# Patient Record
Sex: MALE | Race: WHITE | NOT HISPANIC OR LATINO | URBAN - METROPOLITAN AREA
[De-identification: names, ages, dates, MRNs, and addresses within clinical notes are randomized per-mention and may not be internally consistent; named-entity substitution may affect disease eponyms.]

---

## 2018-04-02 ENCOUNTER — RECORDS - HEALTHEAST (OUTPATIENT)
Dept: LAB | Facility: CLINIC | Age: 63
End: 2018-04-02

## 2018-04-03 LAB
ANION GAP SERPL CALCULATED.3IONS-SCNC: 13 MMOL/L (ref 5–18)
BUN SERPL-MCNC: 11 MG/DL (ref 8–22)
CALCIUM SERPL-MCNC: 9.3 MG/DL (ref 8.5–10.5)
CHLORIDE BLD-SCNC: 102 MMOL/L (ref 98–107)
CHOLEST SERPL-MCNC: 267 MG/DL
CO2 SERPL-SCNC: 23 MMOL/L (ref 22–31)
CREAT SERPL-MCNC: 0.84 MG/DL (ref 0.7–1.3)
FASTING STATUS PATIENT QL REPORTED: ABNORMAL
GFR SERPL CREATININE-BSD FRML MDRD: >60 ML/MIN/1.73M2
GLUCOSE BLD-MCNC: 87 MG/DL (ref 70–125)
HDLC SERPL-MCNC: 57 MG/DL
LDLC SERPL CALC-MCNC: 174 MG/DL
POTASSIUM BLD-SCNC: 3.5 MMOL/L (ref 3.5–5)
SODIUM SERPL-SCNC: 138 MMOL/L (ref 136–145)
TRIGL SERPL-MCNC: 181 MG/DL

## 2019-04-18 ENCOUNTER — RECORDS - HEALTHEAST (OUTPATIENT)
Dept: LAB | Facility: CLINIC | Age: 64
End: 2019-04-18

## 2019-04-18 LAB
ANION GAP SERPL CALCULATED.3IONS-SCNC: 12 MMOL/L (ref 5–18)
BUN SERPL-MCNC: 13 MG/DL (ref 8–22)
CALCIUM SERPL-MCNC: 9.8 MG/DL (ref 8.5–10.5)
CHLORIDE BLD-SCNC: 103 MMOL/L (ref 98–107)
CHOLEST SERPL-MCNC: 222 MG/DL
CO2 SERPL-SCNC: 23 MMOL/L (ref 22–31)
CREAT SERPL-MCNC: 0.82 MG/DL (ref 0.7–1.3)
FASTING STATUS PATIENT QL REPORTED: ABNORMAL
GFR SERPL CREATININE-BSD FRML MDRD: >60 ML/MIN/1.73M2
GLUCOSE BLD-MCNC: 81 MG/DL (ref 70–125)
HDLC SERPL-MCNC: 52 MG/DL
LDLC SERPL CALC-MCNC: 136 MG/DL
POTASSIUM BLD-SCNC: 3.6 MMOL/L (ref 3.5–5)
PSA SERPL-MCNC: 1.9 NG/ML (ref 0–4.5)
SODIUM SERPL-SCNC: 138 MMOL/L (ref 136–145)
TRIGL SERPL-MCNC: 170 MG/DL

## 2019-04-19 ENCOUNTER — AMBULATORY - HEALTHEAST (OUTPATIENT)
Dept: SURGERY | Facility: CLINIC | Age: 64
End: 2019-04-19

## 2019-04-19 DIAGNOSIS — K40.90 LEFT INGUINAL HERNIA: ICD-10-CM

## 2019-04-29 ENCOUNTER — OFFICE VISIT - HEALTHEAST (OUTPATIENT)
Dept: SURGERY | Facility: CLINIC | Age: 64
End: 2019-04-29

## 2019-04-29 DIAGNOSIS — K40.90 NON-RECURRENT UNILATERAL INGUINAL HERNIA WITHOUT OBSTRUCTION OR GANGRENE: ICD-10-CM

## 2019-04-29 RX ORDER — TERAZOSIN 5 MG/1
5 CAPSULE ORAL AT BEDTIME
Status: SHIPPED | COMMUNITY
Start: 2019-04-18

## 2019-04-29 RX ORDER — AMLODIPINE BESYLATE 5 MG/1
5 TABLET ORAL DAILY
Status: SHIPPED | COMMUNITY
Start: 2017-08-14

## 2019-04-29 RX ORDER — PRAVASTATIN SODIUM 40 MG
40 TABLET ORAL DAILY
Status: SHIPPED | COMMUNITY
Start: 2018-04-02

## 2019-04-29 RX ORDER — POTASSIUM CHLORIDE 1500 MG/1
20 TABLET, EXTENDED RELEASE ORAL DAILY
Refills: 5 | Status: SHIPPED | COMMUNITY
Start: 2018-05-14

## 2019-04-29 RX ORDER — LOSARTAN POTASSIUM AND HYDROCHLOROTHIAZIDE 25; 100 MG/1; MG/1
TABLET ORAL
Status: SHIPPED | COMMUNITY
Start: 2017-08-14

## 2019-04-29 ASSESSMENT — MIFFLIN-ST. JEOR: SCORE: 1805.61

## 2019-05-03 ENCOUNTER — SURGERY - HEALTHEAST (OUTPATIENT)
Dept: SURGERY | Facility: CLINIC | Age: 64
End: 2019-05-03

## 2019-05-16 ENCOUNTER — RECORDS - HEALTHEAST (OUTPATIENT)
Dept: LAB | Facility: CLINIC | Age: 64
End: 2019-05-16

## 2019-05-17 LAB
ANION GAP SERPL CALCULATED.3IONS-SCNC: 12 MMOL/L (ref 5–18)
BUN SERPL-MCNC: 15 MG/DL (ref 8–22)
CALCIUM SERPL-MCNC: 9.7 MG/DL (ref 8.5–10.5)
CHLORIDE BLD-SCNC: 104 MMOL/L (ref 98–107)
CO2 SERPL-SCNC: 24 MMOL/L (ref 22–31)
CREAT SERPL-MCNC: 0.86 MG/DL (ref 0.7–1.3)
GFR SERPL CREATININE-BSD FRML MDRD: >60 ML/MIN/1.73M2
GLUCOSE BLD-MCNC: 83 MG/DL (ref 70–125)
POTASSIUM BLD-SCNC: 3.5 MMOL/L (ref 3.5–5)
SODIUM SERPL-SCNC: 140 MMOL/L (ref 136–145)

## 2019-05-21 ENCOUNTER — ANESTHESIA - HEALTHEAST (OUTPATIENT)
Dept: SURGERY | Facility: AMBULATORY SURGERY CENTER | Age: 64
End: 2019-05-21

## 2019-05-21 ASSESSMENT — MIFFLIN-ST. JEOR: SCORE: 1805.61

## 2019-05-22 ENCOUNTER — SURGERY - HEALTHEAST (OUTPATIENT)
Dept: SURGERY | Facility: AMBULATORY SURGERY CENTER | Age: 64
End: 2019-05-22

## 2019-05-22 ASSESSMENT — MIFFLIN-ST. JEOR: SCORE: 1805.61

## 2019-10-09 ENCOUNTER — RECORDS - HEALTHEAST (OUTPATIENT)
Dept: ADMINISTRATIVE | Facility: OTHER | Age: 64
End: 2019-10-09

## 2019-10-09 ENCOUNTER — AMBULATORY - HEALTHEAST (OUTPATIENT)
Dept: PHYSICAL MEDICINE AND REHAB | Facility: CLINIC | Age: 64
End: 2019-10-09

## 2019-10-09 DIAGNOSIS — R20.2 TINGLING OF RIGHT UPPER EXTREMITY: ICD-10-CM

## 2019-10-10 ENCOUNTER — RECORDS - HEALTHEAST (OUTPATIENT)
Dept: ADMINISTRATIVE | Facility: OTHER | Age: 64
End: 2019-10-10

## 2019-10-11 ENCOUNTER — HOSPITAL ENCOUNTER (OUTPATIENT)
Dept: PHYSICAL MEDICINE AND REHAB | Facility: CLINIC | Age: 64
Discharge: HOME | End: 2019-10-11
Attending: FAMILY MEDICINE

## 2019-10-11 DIAGNOSIS — R20.2 NUMBNESS AND TINGLING OF RIGHT UPPER EXTREMITY: ICD-10-CM

## 2019-10-11 DIAGNOSIS — M48.02 FORAMINAL STENOSIS OF CERVICAL REGION: ICD-10-CM

## 2019-10-11 DIAGNOSIS — M47.812 ARTHROPATHY OF CERVICAL FACET JOINT: ICD-10-CM

## 2019-10-11 DIAGNOSIS — M54.2 NECK PAIN ON RIGHT SIDE: ICD-10-CM

## 2019-10-11 DIAGNOSIS — M54.59 LUMBAR FACET JOINT PAIN: ICD-10-CM

## 2019-10-11 DIAGNOSIS — R20.0 NUMBNESS AND TINGLING OF RIGHT UPPER EXTREMITY: ICD-10-CM

## 2019-10-21 ENCOUNTER — OFFICE VISIT - HEALTHEAST (OUTPATIENT)
Dept: PHYSICAL THERAPY | Facility: REHABILITATION | Age: 64
End: 2019-10-21

## 2019-10-21 DIAGNOSIS — M54.2 ACUTE NECK PAIN: ICD-10-CM

## 2019-10-21 DIAGNOSIS — R29.3 POOR POSTURE: ICD-10-CM

## 2019-10-21 DIAGNOSIS — M62.81 GENERALIZED MUSCLE WEAKNESS: ICD-10-CM

## 2019-10-25 ENCOUNTER — OFFICE VISIT - HEALTHEAST (OUTPATIENT)
Dept: PHYSICAL THERAPY | Facility: REHABILITATION | Age: 64
End: 2019-10-25

## 2019-10-25 DIAGNOSIS — M62.81 GENERALIZED MUSCLE WEAKNESS: ICD-10-CM

## 2019-10-25 DIAGNOSIS — R29.3 POOR POSTURE: ICD-10-CM

## 2019-10-25 DIAGNOSIS — M54.2 ACUTE NECK PAIN: ICD-10-CM

## 2019-10-31 ENCOUNTER — OFFICE VISIT - HEALTHEAST (OUTPATIENT)
Dept: PHYSICAL THERAPY | Facility: REHABILITATION | Age: 64
End: 2019-10-31

## 2019-10-31 DIAGNOSIS — M62.81 GENERALIZED MUSCLE WEAKNESS: ICD-10-CM

## 2019-10-31 DIAGNOSIS — R29.3 POOR POSTURE: ICD-10-CM

## 2019-10-31 DIAGNOSIS — M54.2 ACUTE NECK PAIN: ICD-10-CM

## 2019-11-14 ENCOUNTER — OFFICE VISIT - HEALTHEAST (OUTPATIENT)
Dept: PHYSICAL THERAPY | Facility: REHABILITATION | Age: 64
End: 2019-11-14

## 2019-11-14 DIAGNOSIS — M54.2 ACUTE NECK PAIN: ICD-10-CM

## 2019-11-14 DIAGNOSIS — R29.3 POOR POSTURE: ICD-10-CM

## 2019-11-14 DIAGNOSIS — M62.81 GENERALIZED MUSCLE WEAKNESS: ICD-10-CM

## 2021-01-12 ENCOUNTER — RECORDS - HEALTHEAST (OUTPATIENT)
Dept: LAB | Facility: CLINIC | Age: 66
End: 2021-01-12

## 2021-01-12 LAB
ANION GAP SERPL CALCULATED.3IONS-SCNC: 12 MMOL/L (ref 5–18)
BUN SERPL-MCNC: 14 MG/DL (ref 8–22)
CALCIUM SERPL-MCNC: 9.5 MG/DL (ref 8.5–10.5)
CHLORIDE BLD-SCNC: 101 MMOL/L (ref 98–107)
CHOLEST SERPL-MCNC: 222 MG/DL
CO2 SERPL-SCNC: 25 MMOL/L (ref 22–31)
CREAT SERPL-MCNC: 0.92 MG/DL (ref 0.7–1.3)
FASTING STATUS PATIENT QL REPORTED: ABNORMAL
GFR SERPL CREATININE-BSD FRML MDRD: >60 ML/MIN/1.73M2
GLUCOSE BLD-MCNC: 132 MG/DL (ref 70–125)
HDLC SERPL-MCNC: 55 MG/DL
LDLC SERPL CALC-MCNC: 130 MG/DL
POTASSIUM BLD-SCNC: 3.1 MMOL/L (ref 3.5–5)
PSA SERPL-MCNC: 2.9 NG/ML (ref 0–4.5)
SODIUM SERPL-SCNC: 138 MMOL/L (ref 136–145)
TRIGL SERPL-MCNC: 184 MG/DL

## 2021-05-28 NOTE — PROGRESS NOTES
HPI:  Steven Avila is a 63 y.o. male who was referred to me by Wilner Tse MD for an inguinal hernia. He  presents today with a newly diagnosed left inguinal hernia.  Not being aware of this until undergoing a physical recently, where he was found to have a small left anal hernia.  He reports occasional mild discomfort in the lower abdominal region, but is not aware of a bulge at baseline, and notes no impediment to his work which is working as a .  Is never had a previous inguinal hernia repair, never had previous umbilical hernia repair.    Allergies:Benazepril    Past Medical History:   Diagnosis Date     Hypertension        Past Surgical History:   Procedure Laterality Date     NO PAST SURGERIES         CURRENT MEDS:  Current Outpatient Medications   Medication Sig Dispense Refill     amLODIPine (NORVASC) 5 MG tablet Take 5 mg by mouth daily.       losartan-hydrochlorothiazide (HYZAAR) 100-25 mg per tablet 1 tab(s)       potassium chloride (K-DUR,KLOR-CON) 20 MEQ tablet Take 20 mEq by mouth daily.  5     pravastatin (PRAVACHOL) 40 MG tablet Take 40 mg by mouth daily.       terazosin (HYTRIN) 5 MG capsule Take 5 mg by mouth at bedtime.       No current facility-administered medications for this visit.        Family History   Problem Relation Age of Onset     Emphysema Mother      Lung cancer Father      Heart disease Father         reports that he has been smoking.  He has been smoking about 0.50 packs per day. He has never used smokeless tobacco. He reports that he drinks alcohol. He reports that he does not use drugs.    Review of Systems -   The 10 point review of systems  is within normal limits except for as mentioned above in the HPI.  General ROS: No complaints or constitutional symptoms  Skin: No complaints or symptoms   Hematologic/Lymphatic: No symptoms or complaints  Psychiatric: No symptoms or complaints  Endocrine: No excessive fatigue, no hypermetabolic symptoms  "reported  Respiratory ROS: no cough, shortness of breath, or wheezing  Cardiovascular ROS: no chest pain or dyspnea on exertion  Gastrointestinal ROS: As per HPI  Musculoskeletal ROS: no recent injuries reported  Neurological ROS: no focal neurologic defects reported.        /70 (Patient Site: Right Arm, Patient Position: Sitting, Cuff Size: Adult Large)   Pulse 68   Ht 5' 10.75\" (1.797 m)   Wt 221 lb (100.2 kg)   SpO2 97%   BMI 31.04 kg/m    Body mass index is 31.04 kg/m .    EXAM:  General : Alert, cooperative, appears stated age   Skin: Skin color, texture, turgor normal, no rashes or lesions   Lymphatic: No obvious adenopathy, no swelling   Eyes: No scleral icterus, pupils equal  HENT: no traumatic injury to the head or face, no gross abnormalities  Lungs: Normal respiratory effort, breath sounds equal bilaterally  Heart: Regular rate and rhythm  Abdomen: Soft, nondistended.  No visual asymmetry in the groin.  No palpable hernia with Valsalva on the right side.  On the left, there is a small subtle inguinal hernia present with Valsalva.  Musculoskeletal: No obvious swelling,  Neurologic: Grossly intact        Assessment/Plan:   1. Non-recurrent unilateral inguinal hernia without obstruction or gangrene        Steven Avila is a 63 y.o. male with an asymptomatic left inguinal hernia.  I have discussed the pathophysiology of inguinal hernias at length as well as the  surgical and non-operative management strategies.      In particular, the risks and benefits of laparoscopic vs. open inguinal hernia surgery were explained in detail which include, but are not limited to, bleeding, infection of the mesh, recurrence of the hernia, chronic pain, poor cosmesis, the need for reoperative intervention, the possibility of conversion from a laparoscopic approach to an open approach, subcutaneous emphysema, injury to vital structures,  blood clots, heart attack, stroke and death.  Additionally, the risks of " observation were also discussed in detail which include, but are not limited to, chronic pain, enlargement of the hernia, incarceration, strangulation and death.      He understands everything that was discussed and given its asymptomatic nature, would like to defer on having repaired at this time.  We did review signs and symptoms of obstruction and strangulation, and is aware that should these develop he should contact us sooner rather than later.  He is aware he can contact us any time should he wish to have this hernia repaired.     Lizandro Magdaleno MD  [phone]  [facility] Department of Surgery

## 2021-05-29 NOTE — ANESTHESIA CARE TRANSFER NOTE
Last vitals:   Vitals:    05/22/19 1232   BP: 142/68   Pulse: 78   Resp: 16   Temp: 36.4  C (97.6  F)   SpO2: 99%     Patient's level of consciousness is drowsy  Spontaneous respirations: yes  Maintains airway independently: yes  Dentition unchanged: yes  Oropharynx: oropharynx clear of all foreign objects    QCDR Measures:  ASA# 20 - Surgical Safety Checklist: WHO surgical safety checklist completed prior to induction    PQRS# 430 - Adult PONV Prevention: 4558F - Pt received => 2 anti-emetic agents (different classes) preop & intraop  ASA# 8 - Peds PONV Prevention: NA - Not pediatric patient, not GA or 2 or more risk factors NOT present  PQRS# 424 - Myrna-op Temp Management: 4559F - At least one body temp DOCUMENTED => 35.5C or 95.9F within required timeframe  PQRS# 426 - PACU Transfer Protocol: - Transfer of care checklist used  ASA# 14 - Acute Post-op Pain: ASA14B - Patient did NOT experience pain >= 7 out of 10

## 2021-05-29 NOTE — ANESTHESIA POSTPROCEDURE EVALUATION
Patient: Steven Avila  REPAIR, HERNIA, INGUINAL, LAPAROSCOPIC  Anesthesia type: general    Patient location: Phase II Recovery  Last vitals:   Vitals Value Taken Time   /72 5/22/2019  1:15 PM   Temp 36.3  C (97.3  F) 5/22/2019  1:15 PM   Pulse 65 5/22/2019  1:24 PM   Resp 16 5/22/2019  1:15 PM   SpO2 93 % 5/22/2019  1:24 PM   Vitals shown include unvalidated device data.  Post vital signs: stable  Level of consciousness: awake and responds to simple questions  Post-anesthesia pain: pain controlled  Post-anesthesia nausea and vomiting: no  Pulmonary: unassisted, return to baseline  Cardiovascular: stable and blood pressure at baseline  Hydration: adequate  Anesthetic events: no    QCDR Measures:  ASA# 11 - Myrna-op Cardiac Arrest: ASA11B - Patient did NOT experience unanticipated cardiac arrest  ASA# 12 - Myrna-op Mortality Rate: ASA12B - Patient did NOT die  ASA# 13 - PACU Re-Intubation Rate: ASA13B - Patient did NOT require a new airway mgmt  ASA# 10 - Composite Anes Safety: ASA10A - No serious adverse event    Additional Notes:

## 2021-06-02 NOTE — PROGRESS NOTES
[facility]   Cervical Thoracic Initial Evaluation    Patient Name: Steven Avila  Date of evaluation: 10/21/2019  Referral Diagnosis: Neck pain on right side  Referring provider: Ofelia Sanders C*  Visit Diagnosis:     ICD-10-CM    1. Acute neck pain M54.2    2. Poor posture R29.3    3. Generalized muscle weakness M62.81         Per insurance verification: approved for  Visits: 18  Start Date: 10/21/2019  End Date: 12/23/2019    Assessment:      Impairments in  pain, posture, ROM, joint mobility, sensory function  The POC is dynamic and will be modified on an ongoing basis.  Barriers to achieving goals as noted in the assessment section may affect outcome.  Prognosis to achieve goals is  good   Pt. is appropriate for skilled PT intervention as outlined in the Plan of Care (POC).  Pt. is a good candidate for skilled PT services to improve pain levels and function.  Plan of care and goals were established in collaboration with patient.     Steven Avila is a 63 y.o. male who presents to therapy today with chief complaints of right sided cervical spine pain and n/t along his R upper trapezius. Onset date of sx was July 2019.  Pt reported h/o repetitive lifting/motions at work. He reports his neck pain started at work but does not remember a specific incident/injury during onset time.  Pain symptoms are getting better as he had this last week off work.  Functional impairments include bed mobility, sleeping on his L side and will have intermittent n/t throughout his day.  Pt demo's signs and sx consistent with acute right sided neck pain likely secondary to facet stenosis with poor seated posture.       Per chart review: Cervical spine MRI [facility] 10/3/2019 does show advanced facet arthropathy in the right at C3-4 with marrow edema consistent with inflammatory arthropathy with severe right foraminal stenosis, moderate to severe left.  Patient also has moderate facet arthropathy in the left at C4-5.  Mild  "right facet at C5-6.       Goals:  Pt. will demonstrate/verbalize independence in self-management of condition in : 4 weeks  Pt. will be independent with home exercise program in : 4 weeks    Pt will: be able to change sleeping positions without symptoms in 8 weeks.   Pt will: work without having symptoms into his right cervical spine/shoulder to return to PLOF in 8 weeks.       Patient's expectations/goals are realistic.    Barriers to Learning or Achieving Goals:  No Barriers.       Plan / Patient Instructions:        Plan of Care:   Authorization / Certification Start Date: 10/21/19  Authorization / Certification End Date: 12/23/19  Communication with: Referral Source  Patient Related Instruction: Nature of Condition;Treatment plan and rationale;Self Care instruction;Body mechanics;Next steps;Expected outcome;Posture;Basis of treatment;Precautions  Times per Week: 1-2  Number of Weeks: 8  Number of Visits: 12  Discharge Planning: when goals are met or pt has reached a plateau in progress  Therapeutic Exercise: ROM;Strengthening;Stretching  Neuromuscular Reeducation: kinesio tape;posture;balance/proprioception;TNE;core  Manual Therapy: soft tissue mobilization;myofascial release;joint mobilization  Equipment: theraband      Plan for next visit: continue with MT as needed, progress cervical/scapular strengthening as able, nerve glides     Subjective:         Social information:   Occupation:   Work Status:Working full time    History of Present Illness:    Steven is a 63 y.o. male who presents to therapy today with complaints of R sided cervical spine pain. Date of onset/duration of symptoms is July 2019. Pt reports it \"goes numb\" on him that happens intermittently throughout his day. It was better this past week but he was on vacation. He does not report any loss of strength. He has also been taking a few ibuprofen for pain relief.     Past medical history significant for hypertension, " hyperlipidemia, advanced adenoma the colon, tobacco use, BPH, inguinal hernia repair    Pain Ratin  Pain rating at best: 0  Pain rating at worst: 7  Pain description: aching, dull and numbness    Functional limitations are described as occurring with:   Change sleeping positions- unable to sleep on L side  Numbness throughout work day             Objective:      Note: Items left blank indicates the item was not performed or not indicated at the time of the evaluation.    Patient Outcome Measures :    Neck Disability Score in %: 10     Scores range from 0-100%, where a score of 0% represents minimal pain and maximal function. The minmal clinically important difference is a score reduction of 10%.    Cervical Thoracic Examination  1. Acute neck pain     2. Poor posture     3. Generalized muscle weakness       Involved side: Right  Posture Observation:      General sitting posture is  fair.    Cervical ROM:    Date: 10/21/2019     *Indicate scale AROM AROM AROM   Cervical Flexion 30 deg     Cervical Extension 18 deg      Right Left Right Left Right Left   Cervical Sidebending 20 deg 20 deg        Cervical Rotation 40 deg 40 deg        Cervical Protraction      Cervical Retraction      Thoracic Flexion      Thoracic Extension      Thoracic Sidebending         Thoracic Rotation           Strength     Date: 10/21/2019     Cervical Myotomes/5 Right Left Right Left Right Left   Cervical Flexion (C1-2)         Cervical Sidebending (C3)         Shoulder Flexion 5 5       Shoulder Abduction (C5) 5 5       Elbow Flexion (C6) 5 5       Elbow Extension (C7) 5 5       Wrist Flexion (C7) 5 5       Wrist Extension (C6) 5 5       Thumb abduction (C8) 5 4       Finger Abduction (T1) 5 4         Sensation    In tact today, pt did not report n/t during testing Reflex Testing  Cervical Dermatomes Right Left UE Reflexes Right Left   Back of the Head (C2)   Biceps (C5-6)     Supraclavicular Fossa (C3) WNL WNL Brachioradialis (C5-6)      AC Joint (C4) WNL WNL Triceps (C7-8)     Lateral Biceps (C5) WNL WNL Taylor s test     Palmar Thumb (C6) WNL WNL LE Reflexes     Palmar 3rd Finger (C7) WNL WNL Patellar (L3-4)     Palmar 5th Finger (C8) WNL WNL Achilles (S1-2)     Ulnar Forearm (T1) WNL WNL Babinski Response         Palpation: mild tenderness along izabella cervical paraspinals, scalenes, and upper trapezius (L>R)    Cervical Special Tests     Cervical Special Tests Right Left UE Nerve Mobility Right Left   Cervical compression - - Median nerve + -   Cervical distraction   Ulnar nerve     Spurling s test - - Radial nerve     Shoulder abduction sign   Thoracic outlet     Deep neck flexor endurance test   Jenna     Upper cervical rotation   Adson s     Sharper-Javy   Cervical rotation lateral flexion     Alar ligament test   Other:     Other:   Other:       UE Screen: Shoulder AROM WNL    Treatment Today     TREATMENT MINUTES COMMENTS   Evaluation 20 -cervical spine   Self-care/ Home management     Manual therapy 10 -supine manual cervical distraction in slight flexion 4 x 30 sec holds  -supine STM to izabella scalenes, cervical paraspinals   Neuromuscular Re-education 5 -PNE education with regards to space, blood flow and movement required for a healthy nervous system  -see nerve glides in flow sheet   Therapeutic Activity     Therapeutic Exercises 15 -see exercise flow sheet   Gait training     Modality__________________                Total 50    Blank areas are intentional and mean the treatment did not include these items.     PT Evaluation Code: (Please list factors)  Patient History/Comorbidities: see above  Examination: cervical spine  Clinical Presentation: stable  Clinical Decision Making: low    Patient History/  Comorbidities Examination  (body structures and functions, activity limitations, and/or participation restrictions) Clinical Presentation Clinical Decision Making (Complexity)   No documented Comorbidities or personal factors 1-2  Elements Stable and/or uncomplicated Low   1-2 documented comorbidities or personal factor 3 Elements Evolving clinical presentation with changing characteristics Moderate   3-4 documented comorbidities or personal factors 4 or more Unstable and unpredictable High                Kaleigh Uriostegui, PT, DPT  10/21/2019  11:34 AM

## 2021-06-02 NOTE — PROGRESS NOTES
" [facility] Daily Progress     Patient Name: Steven Avila  Date: 10/31/2019  Visit #: 3/12 until 19  PTA visit #:  na  Referral Diagnosis: Neck pain on right side  Referring provider:Ofelia Sanders CNP  Visit Diagnosis:     ICD-10-CM    1. Acute neck pain M54.2    2. Poor posture R29.3    3. Generalized muscle weakness M62.81        Past Medical History:   Diagnosis Date     Hypertension          Assessment:     HEP/POC compliance is  good .  Response to Intervention Improvement in pain with modifications to HEP. Demonstrates improvements in cervical AROM since IE.  Patient is benefitting from skilled physical therapy and is making steady progress toward functional goals.  Patient is appropriate to continue with skilled physical therapy intervention, as indicated by initial plan of care.    Goal Status:  Pt. will demonstrate/verbalize independence in self-management of condition in : 4 weeks;Progressing toward  Pt. will be independent with home exercise program in : 4 weeks;Met    Pt will: be able to change sleeping positions without symptoms in 8 weeks.  (progressing towards)  Pt will: work without having symptoms into his right cervical spine/shoulder to return to OF in 8 weeks.       Plan / Patient Education:     Continue with initial plan of care.  Progress with home program as tolerated. cervical joint mobilizations if tolerated    Subjective:     Pain Ratin  Pt states things are better in the morning (only intermittent n/t) and has increased n/t in the evening. Still getting n/t but has improved with modifications to HEP. The exercises do help with his pain- does them as much as he can. Felt sore after the last MT completed- no improvement in pain.      Objective:     Cervical AROM:  Flexion: WNL  Ext: mild  Rotation: R mild - \"tightness\" L mild    Tender with hypertonicity along R UT    Exercises:  Exercise #1: supine chin tuck with retraction on pillow - hold for now as this " positioning caused increase symptoms  Comment #1: reach and roll-modified 5-10 reps, in R S/L  Exercise #2: seated scapular retraction 10x   Comment #2: seated cervical retraction 10x   Exercise #3: median nerve glides 10x on R, decreased height and stopped head turn; 2-3 times a day  Comment #3: UBE 4 min F/ B, WL4.5        Treatment Today     TREATMENT MINUTES COMMENTS   Evaluation     Self-care/ Home management     Manual therapy 10 -supine STM to izabella upper trapezius and scalenes   Neuromuscular Re-education     Therapeutic Activity     Therapeutic Exercises 15 -see exercise flow sheet for date completed   Gait training     Modality__________________                Total 25    Blank areas are intentional and mean the treatment did not include these items.       Kaleigh Uriostegui, PT, DPT  10/31/2019

## 2021-06-02 NOTE — PATIENT INSTRUCTIONS - HE
Discussed the importance of core strengthening, ROM, stretching exercises with the patient and how each of these entities is important in decreasing pain.  Explained to the patient that the purpose of physical therapy is to teach the patient a home exercise program.  These exercises need to be performed every day in order to decrease pain and prevent future occurrences of pain.        ~Please call Nurse Navigation line [phone] with any questions or concerns about your treatment plan, if symptoms worsen and you would like to be seen urgently, or if you have problems controlling bladder and bowel function.      [facility] locations:    [place] - [phone]  [address] [place] [address]      [facility] [phone]  [address]  [facility] [place] [place] [address]        [facility] [phone]  [address] [place] [address]      [facility] - [phone]  [address] [place] [address]      [place] [phone]   [address] [place] [address]

## 2021-06-02 NOTE — PROGRESS NOTES
ASSESSMENT: Steven Avila is a 63 y.o. male presents for consultation at the request of PCP Wilner Tse MD, with past medical history significant for hypertension, hyperlipidemia, advanced adenoma the colon, tobacco use, BPH, inguinal hernia repair, who presents today for new patient evaluation of :     -Right upper cervical spine pain that radiates to the right trapezius with paresthesias.  Pain most consistent with cervical facet mediated pain and he does have severe facet arthropathy at C3-4.  Some increased pain with Spurling maneuver as well indicating radiculitis with paresthesias, he also has significant right foraminal stenosis at this level.    Patient is neurologically intact on exam. No myelopathic or red flag symptoms.      NDI Score: 14    WHO 5: 25       Diagnoses and all orders for this visit:    Neck pain on right side  -     Ambulatory referral to PT/OT    Numbness and tingling of right upper extremity  -     Ambulatory referral to PT/OT    Arthropathy of cervical facet joint  -     Ambulatory referral to PT/OT    Lumbar facet joint pain  -     Ambulatory referral to PT/OT    Foraminal stenosis of cervical region  -     Ambulatory referral to PT/OT       PLAN:  Reviewed spine anatomy and disease process. Discussed diagnosis and treatment options with the patient today. A shared decision making model was used. The patient's values and choices were respected. The following represents what was discussed and decided upon by the provider and the patient.     -DIAGNOSTIC TESTS:  Images were personally reviewed and interpreted and explained to patient today using spine model.   --Cervical spine MRI [facility] 10/3/2019 does show advanced facet arthropathy in the right at C3-4 with marrow edema consistent with inflammatory arthropathy with severe right foraminal stenosis, moderate to severe left.  Patient also has moderate facet arthropathy in the left at C4-5.  Mild right facet at C5-6.    -PHYSICAL  THERAPY: [facility] to establish home exercise program.  Discussed the importance of core strengthening, ROM, stretching exercises with the patient and how each of these entities is important in decreasing pain.  Explained to the patient that the purpose of physical therapy is to teach the patient a home exercise program.  These exercises need to be performed every day in order to decrease pain and prevent future occurrences of pain.  Likened it to brushing one's teeth.      -MEDICATIONS: No change in medications at this time as patient prefers to continue with ibuprofen only as needed which is helpful.  He prefers not to take medications on a daily basis as the pain is tolerable.  Discussed multiple medication options today with patient. Discussed risks, side effects, and proper use of medications. Patient verbalized understanding.    -INTERVENTIONS: Discussed with patient that if symptoms do not improve with physical therapy or aggravated with physical therapy I would recommend trialing a right C3-4 facet joint steroid injection as he does have advanced facet arthropathy with marrow edema at this level.  If he did not get relief with the facet injection we could consider a right with C3-4 TFESI as he also has severe right foraminal stenosis at this level and some radicular symptoms..  Discussed risks and benefits of injections with patient today.    -PATIENT EDUCATION: 45 minutes of total visit time was spent face to face with the patient today, greater than 50% of total time spent with patient was spent on counseling, education, and coordinating care.   -10 minutes spent outside of visit time, non-face-to-face time, reviewing chart.    -FOLLOW-UP:   Follow-up as needed if symptoms are not improving with PT or worsening/change at any time.    Advised patient to call the [facility] if symptoms worsen or you have problems controlling bladder and bowel function.    ______________________________________________________________________    SUBJECTIVE:   Steven Avila  is a 63 y.o. male who presents today for new patient evaluation of neck pain right upper cervical spine that radiates to the right trapezius  region with numbness and tingling, that is been ongoing since approximately July 1, patient is unsure of specific date.  Does report that he was at work when the pain started and he does have a significant amount of repetitive lifting that he has to do but does not recall a specific injury at work precipitating the pain.  Patient currently reports his right-sided neck pain that is a 2/10, a 5 at its worst most bothersome with turning his head to the right.    Patient denies any further arm pain, denies upper extremity weakness, denies recent trips or falls or balance changes, denies bowel or bladder loss control.  No prior history of similar neck pain in the past.    -Treatment to Date: No prior spinal surgery or spinal injection.  No prior physical therapy for neck pain.    -Medications:  Tramadol 50 mg prescribed 5/22/2019, 8 tablets not currently taking  Ibuprofen takes on occasions for pain.  Medrol Dosepak 9/20/2019 with some relief.    Current Outpatient Medications on File Prior to Encounter   Medication Sig Dispense Refill     amLODIPine (NORVASC) 5 MG tablet Take 5 mg by mouth daily.       losartan-hydrochlorothiazide (HYZAAR) 100-25 mg per tablet 1 tab(s)       potassium chloride (K-DUR,KLOR-CON) 20 MEQ tablet Take 20 mEq by mouth daily.  5     pravastatin (PRAVACHOL) 40 MG tablet Take 40 mg by mouth daily.       terazosin (HYTRIN) 5 MG capsule Take 5 mg by mouth at bedtime.       [DISCONTINUED] traMADol (ULTRAM) 50 mg tablet Take 1 tablet (50 mg total) by mouth every 6 (six) hours as needed for pain. 8 tablet 0     No current facility-administered medications on file prior to encounter.        Allergies   Allergen Reactions     Benazepril      Other reaction(s):  cough       Past Medical History:   Diagnosis Date     Hypertension         Patient Active Problem List   Diagnosis     Inguinal hernia       Past Surgical History:   Procedure Laterality Date     COLONOSCOPY       LAPAROSCOPIC INGUINAL HERNIA REPAIR Left 5/22/2019    Procedure: REPAIR, HERNIA, INGUINAL, LAPAROSCOPIC;  Surgeon: Lizandro Magdaleno MD;  Location: [facility];  Service: General     NO PAST SURGERIES         Family History   Problem Relation Age of Onset     Emphysema Mother      Lung cancer Father      Heart disease Father        Reviewed past medical, surgical, and family history with patient found on new patient intake packet located in EMR Media tab.     SOCIAL HX: Patient is currently working as a  and still working without difficulties.  Patient is .  Patient does smoke on a daily basis as well as drink alcohol but denies being history of a heavy drinker, denies occasional drug use.    ROS: Positive for ringing in ears, cough.  Specifically negative for bowel/bladder dysfunction, balance changes, headache, dizziness, foot drop, fevers, chills, appetite changes, nausea/vomiting, unexplained weight loss. Otherwise 13 systems reviewed are negative. Please see the patient's intake questionnaire from today for details.    OBJECTIVE:  /77 (Patient Site: Right Arm, Patient Position: Sitting)   Pulse 77   Wt 204 lb (92.5 kg)   SpO2 97%   BMI 28.65 kg/m      PHYSICAL EXAMINATION:  --CONSTITUTIONAL: Vital signs as above. No acute distress. The patient is well nourished and well groomed.  --PSYCHIATRIC: The patient is awake, alert, oriented to person, place, time and answering questions appropriately with clear speech. Appropriate mood and affect   --HEENT: Sclera are non-injected. Extraocular muscles are intact. Thyroid moves easily upon swallowing.  Moist oral mucosa.  --SKIN: Skin over the face, bilateral upper extremities, and posterior torso is clean, dry, intact  without rashes.  --RESPIRATORY: Normal rhythm and effort. No abnormal accessory muscle breathing patterns noted.   --GROSS MOTOR: Easily arises from a seated position.   --CERVICAL SPINE: Inspection reveals no evidence of deformity. Range of motion is not limited in cervical flexion, extension, increased pain with lateral rotation on the right only.  Mild tenderness to palpation cervical spine right C3-4 region.  Spurling maneuver negative bilaterally on the left and mildly positive on the right with right neck and scapular paresthesias.  --SHOULDERS: Full range of motion bilaterally. Negative empty can.  --UPPER EXTREMITY MOTOR TESTING:  Wrist flexion left 5/5, right 5/5  Wrist extension left 5/5, right 5/5  Pronators left 5/5, right 5/5  Biceps left 5/5, right 5/5   Triceps left 5/5, right 5/5   Shoulder abduction left 5/5, right 5/5   left 5/5, right 5/5  --NEUROLOGIC: CN III-XII are grossly intact. 2/4 symmetric biceps, brachioradialis, triceps reflexes bilaterally. Sensation to upper extremities is no.  Negative Rider's bilaterally.    --VASCULAR: 2/4 radial pulses bilaterally. Warm upper limbs bilaterally. Capillary refill in the upper extremities is less than 1 second.    RESULTS: Prior medical records from [facility] 4/29/2019 to [facility],  and [facility] everywhere were reviewed today.     Imaging:  Cervical spine Imaging was personally reviewed and interpreted today. The images were shown to the patient and the findings were explained using a spine model.      MR CERVICAL SPINE WITHOUT CONTRAST 1.5T  [facility]- 10/3/2019  CLINICAL INFORMATION: Right neck, trapezius and shoulder pain with tingling and numbness in the right arm.  TECHNICAL INFORMATION: T1, T2 GRE, T2 FSE and STIR sagittal thin sections with T2 GRE and FSE axial sections at selected levels.  COMPARISON IMAGES: No comparisons.  INTERPRETATION: Normal signal intensity within the cervical and upper thoracic cord. No Chiari malformation  or syrinx, no intrinsic cord lesion and no intradural mass. Normal vertebral artery flow voids.  Diffuse spondylosis with lordotic alignment of the cervical vertebrae. No posterior ligamentous injury and no spinous process avulsion.  T2-3, T1-2 and C7-T1: Dorsal disc margins and facet joints normal. No stenosis or impingement.  C6-7: Mild dorsal bulging. Mild right and moderate left foraminal stenosis with mild facet arthropathy on the left.  C5-6: Normal dorsal disc margins with mild right facet arthropathy and no stenosis or impingement.  C4-5: 2-3 mm broad-based left paracentral disc protrusion with mild left ventral cord flattening. Moderate foraminal stenosis with moderate facet arthropathy on the left.  C3-4: Normal dorsal disc margins. Advanced inflammatory facet arthropathy on the right with facet subchondral marrow edema, reactive or inflammatory changes within periarticular soft tissues and marked narrowing of the right neural foramen. Moderately severe foraminal stenosis and moderate facet arthropathy on the left.  C2-3: Normal dorsal disc margins with facet hypertrophy, facet autofusion and moderate foraminal stenosis on the left.  Mild degenerative changes are seen anteriorly at C1-2. No degenerative or erosive changes within the lateral atlantoaxial nor within the cervico-occipital joints.  Marrow signal is otherwise unremarkable. No fracture or avulsion. No destructive bone lesion and no paraspinous mass.  CONCLUSION:  1. Advanced inflammatory facet arthropathy on the right at C3-4 with marked narrowing of the neural foramen, facet subchondral marrow edema and reactive or inflammatory changes within periarticular soft tissues.  2. 2-3 mm left paracentral disc protrusion at C4-5 with mild left ventral cord flattening.  3. Facet arthropathy on the left at multiple levels with facet autofusion at C2-3.  4. Foraminal stenosis on the left, moderately severe at C3-4, and moderate at C6-7, C4-5 and C2-3.

## 2021-06-02 NOTE — PROGRESS NOTES
[facility] Daily Progress     Patient Name: Steven Avila  Date: 10/25/2019  Visit #: 2/12 until 12/23/19  PTA visit #:  na  Referral Diagnosis: Neck pain on right side  Referring provider:Ofelia Sanders CNP  Visit Diagnosis:     ICD-10-CM    1. Acute neck pain M54.2    2. Poor posture R29.3    3. Generalized muscle weakness M62.81        Past Medical History:   Diagnosis Date     Hypertension          Assessment:     HEP/POC compliance is  good .  Response to Intervention Modifications to HEP as it was increasing n/t into his R shoulder.   Patient is appropriate to continue with skilled physical therapy intervention, as indicated by initial plan of care.    Goal Status:  Pt. will demonstrate/verbalize independence in self-management of condition in : 4 weeks  Pt. will be independent with home exercise program in : 4 weeks    Pt will: be able to change sleeping positions without symptoms in 8 weeks.   Pt will: work without having symptoms into his right cervical spine/shoulder to return to PLOF in 8 weeks.       Plan / Patient Education:     Continue with initial plan of care.  Progress with home program as tolerated.    Subjective:     Pain Rating: 3  Pt reports increased pain/numbness on Tuesday. Has been trying the exercises but gets numbness/tingling with turning to the right every time.       Objective:     Cervical AROM:  Flexion: mild  Ext: mod    Exercises:  Exercise #1: supine chin tuck with retraction on pillow 10x 5 sec hold  Comment #1: reach and roll 5-10 reps, in R S/L  Exercise #2: seated scapular retraction 10x   Comment #2: seated cervical retraction 10x   Exercise #3: median nerve glides 10x on R  Comment #3: UBE 4 min F/ B, WL4.0        Treatment Today     TREATMENT MINUTES COMMENTS   Evaluation     Self-care/ Home management     Manual therapy 8 -supine manual cervical distraction in slight flexion 8 x 30 sec holds   Neuromuscular Re-education     Therapeutic Activity     Therapeutic  Exercises 17 -see exercise flow sheet for date completed   Gait training     Modality__________________                Total 25    Blank areas are intentional and mean the treatment did not include these items.       Kaleigh Uriostegui, PT, DPT  10/25/2019

## 2021-06-03 VITALS — WEIGHT: 221 LBS | BODY MASS INDEX: 30.94 KG/M2 | HEIGHT: 71 IN

## 2021-06-03 VITALS — WEIGHT: 204 LBS | BODY MASS INDEX: 28.65 KG/M2

## 2021-06-03 VITALS — BODY MASS INDEX: 30.94 KG/M2 | HEIGHT: 71 IN | WEIGHT: 221 LBS

## 2021-06-03 NOTE — PROGRESS NOTES
[facility] Daily Progress / Discharge Summary    Patient Name: Steven Avila  Date: 2019  Visit #: 4  PTA visit #:  na  Referral Diagnosis: Neck pain on right side  Referring provider: Ofelia Sanders CNP  Visit Diagnosis:     ICD-10-CM    1. Acute neck pain M54.2    2. Poor posture R29.3    3. Generalized muscle weakness M62.81          Assessment:   HEP/POC compliance is  good .  Response to Intervention Pt demonstrates improvements in his cervical AROM, strength and function. He is compliant with his HEP and would like to continue with it independently at this time. Was instructed to follow up with PT by 19 if new concerns/pain arises.    Goal Status:  Pt. will demonstrate/verbalize independence in self-management of condition in : 4 weeks;Met  Pt. will be independent with home exercise program in : 4 weeks;Met    Pt will: be able to change sleeping positions without symptoms in 8 weeks.  (MET)  Pt will: work without having symptoms into his right cervical spine/shoulder to return to OF in 8 weeks.  (n/a, pt has been on vacation, not sure if he will return to work)      Plan / Patient Education:     Initial plan of care has been completed. Patient has responded appropriately to skilled PT intervention.  The patient met goals and has demonstrated understanding of/independence in the home program for self-care and progression to next steps.  The patient will initiate contact if questions or concerns arise.    Subjective:     Pain Ratin  Pt reports things are better. He does not really have any pain just intermittent tingling now. Overall, the tingling is less intense and some days is less often in frequency than others. Exercises are going well. He was able to sleep on his L side now- intermittently having tingling but not consistent.     Patient Outcome Measures  Neck Disability Score in %: 10     Scores range from 0-100%, where a score of 0% represents minimal pain and  maximal function. The minmal clinically important difference is a score reduction of 10%.      Objective:     ervical ROM:    Date: 10/21/2019 11/14/19    *Indicate scale AROM AROM AROM   Cervical Flexion 30 deg 40 deg    Cervical Extension 18 deg 30 deg     Right Left Right Left Right Left   Cervical Sidebending 20 deg 20 deg  20 deg 20 deg     Cervical Rotation 40 deg 40 deg  60 deg 65 deg     Cervical Protraction      Cervical Retraction      Thoracic Flexion      Thoracic Extension      Thoracic Sidebending         Thoracic Rotation           Strength     Date: 10/21/2019 11/14/19    Cervical Myotomes/5 Right Left Right Left Right Left   Cervical Flexion (C1-2)         Cervical Sidebending (C3)         Shoulder Flexion 5 5 5 5     Shoulder Abduction (C5) 5 5 5 5     Elbow Flexion (C6) 5 5 5 5     Elbow Extension (C7) 5 5 5 5     Wrist Flexion (C7) 5 5 5 5     Wrist Extension (C6) 5 5 5 5     Thumb abduction (C8) 5 4 5 4+     Finger Abduction (T1) 5 4 5 4+         Treatment Today  TREATMENT MINUTES COMMENTS   Evaluation     Self-care/ Home management     Manual therapy     Neuromuscular Re-education     Therapeutic Activity     Therapeutic Exercises 25 -see exercise flow sheet for date completed  -educated on discharge- continue with HEP (nerve glides daily, strengthening 3-4x/week, stretches daily) can slowly decrease nerve glides once n/t is gone   Gait training     Modality__________________                Total 25    Blank areas are intentional and mean the treatment did not include these items.     Kaleigh Uriostegui, PT, DPT  11/14/2019

## 2021-06-16 PROBLEM — K40.90 INGUINAL HERNIA: Status: ACTIVE | Noted: 2019-05-07

## 2021-06-17 NOTE — PATIENT INSTRUCTIONS - HE
Patient Instructions by Kaleigh Uriostegui PT at 10/31/2019  9:00 AM     Author: Kaleigh Uriostegui PT Service: -- Author Type: Physical Therapist    Filed: 10/31/2019  9:24 AM Encounter Date: 10/31/2019 Status: Signed    : Kaleigh Uriostegui PT (Physical Therapist)        CERVICAL SIDE BEND    Tilt your head towards the side, then return back to looking straight ahead.  (Be sure to keep you eyes and nose pointed straight ahead the entire time)    Hold 5-10 sec, 3-5 times,

## 2021-06-17 NOTE — PATIENT INSTRUCTIONS - HE
Patient Instructions by Kaleigh Uriostegui PT at 10/25/2019  7:30 AM     Author: Kaleigh Uriostegui PT Service: -- Author Type: Physical Therapist    Filed: 10/25/2019  7:54 AM Encounter Date: 10/25/2019 Status: Signed    : Kaleigh Uriostegui PT (Physical Therapist)        SIDELYING TRUNK ROTATION - MODIFIED    While lying on your side with your knees bent,  slowly twist your upper body to the side and rotated your spine.    LAY ON RIGHT SIDE ONLY    Hold 3-5 sec, 5-10 reps, 1-3 times a day       Look at your hand   Extend your arm out to the side, slightly backward and down   Follow your hand with your eyes   Get a nice, easy stretch (may get a few tingles...which is OK)   Return the hand to the front of the face   5-10 reps, 1-3 times a day      STOP the exercise lying on your back tucking chin down

## 2021-06-17 NOTE — PATIENT INSTRUCTIONS - HE
Patient Instructions by Kaleigh Uriostegui PT at 10/21/2019 11:30 AM     Author: Kaleigh Uriostegui PT Service: -- Author Type: Physical Therapist    Filed: 10/21/2019 12:34 PM Encounter Date: 10/21/2019 Status: Signed    : Kaleigh Uriostegui PT (Physical Therapist)        CERVICAL CHIN TUCK  AND RETRACTION - SUPINE WITH TOWEL    While lying on your back with a small folded up towel under your head, tuck your chin towards your chest. Also, focus on putting pressure on the towel with the back of your head.     Maintain contact of head with the towel the entire time.    Hold 5-10 sec, 10 reps      SIDELYING TRUNK ROTATION    While lying on your side with your arms out-stretched in front of your body, slowly twist your upper body to the side and rotated your spine. Your arms and head should also be rotating along with the spine as shown. Follow your hand with your eyes.    Hold 5-10 sec, 5-10 reps          RETRACTION / CHIN TUCK    Slowly draw your head back so that your ears line up with your shoulders.    Hold 2-3 sec, 5-10 reps, throughout day        SCAPULAR RETRACTIONS    Draw your shoulder blades back and down.    Hold 2-3 sec, 10 reps, throughout day         Look at your hand   Extend your arm out to the side, slightly backward and down   Follow your hand with your eyes   Get a nice, easy stretch (may get a few tingles...which is OK)   Return the hand to the front of the face   5-10 reps, 2-3 times per day, right arm

## 2021-06-17 NOTE — PATIENT INSTRUCTIONS - HE
Patient Instructions by Kaleigh Uriostegui PT at 11/14/2019  1:30 PM     Author: Kaleigh Uriostegui PT Service: -- Author Type: Physical Therapist    Filed: 11/14/2019  1:54 PM Encounter Date: 11/14/2019 Status: Addendum    : Kaleigh Uriostegui PT (Physical Therapist)    Related Notes: Original Note by Kaleigh Uriostegui PT (Physical Therapist) filed at 11/14/2019  1:50 PM        ELASTIC BAND ROWS     Holding elastic band with both hands, draw back the band as you bend your elbows. Keep your elbows near the side of your body.          CHIN TUCK    In hands and knees position, tuck chin in and hold head up. Keep head parallel with the ground, you could also do this lying down on stomach.    Hold 2-3 sec, work up to 10 sec hold, 10 reps     Do these 2 exercise about 3-4 times per week

## 2021-07-03 NOTE — ANESTHESIA PREPROCEDURE EVALUATION
Anesthesia Preprocedure Evaluation by Zacarias Vick MD at 5/22/2019 10:50 AM     Author: Zacarias Vick MD Service: -- Author Type: Physician    Filed: 5/22/2019 10:51 AM Date of Service: 5/22/2019 10:50 AM Status: Signed    : Zacarias Vick MD (Physician)       Anesthesia Evaluation      Patient summary reviewed   No history of anesthetic complications     Airway   Mallampati: II  Neck ROM: full   Pulmonary - normal exam   (+) a smoker                         Cardiovascular - normal exam  Exercise tolerance: > or = 4 METS  (+) hypertension well controlled, , hypercholesterolemia,      Neuro/Psych - negative ROS     Endo/Other    (+) obesity (bmi 31),      GI/Hepatic/Renal - negative ROS           Dental    (+) chipped                           Anesthesia Plan  Planned anesthetic: general endotracheal  Versed/fent/prop/kerline  Decadron/zofran  ASA 2   Induction: intravenous   Anesthetic plan and risks discussed with: patient and spouse  Anesthesia plan special considerations: antiemetics,   Post-op plan: routine recovery        Chemistry        Component Value Date/Time     05/16/2019 1658    K 3.5 05/16/2019 1658     05/16/2019 1658    CO2 24 05/16/2019 1658    BUN 15 05/16/2019 1658    CREATININE 0.86 05/16/2019 1658    GLU 83 05/16/2019 1658        Component Value Date/Time    CALCIUM 9.7 05/16/2019 1658

## 2021-10-18 ENCOUNTER — LAB REQUISITION (OUTPATIENT)
Dept: LAB | Facility: CLINIC | Age: 66
End: 2021-10-18

## 2021-10-18 DIAGNOSIS — I10 ESSENTIAL (PRIMARY) HYPERTENSION: ICD-10-CM

## 2021-10-18 DIAGNOSIS — E78.5 HYPERLIPIDEMIA, UNSPECIFIED: ICD-10-CM

## 2021-10-18 DIAGNOSIS — R42 DIZZINESS AND GIDDINESS: ICD-10-CM

## 2021-10-18 LAB
ALBUMIN SERPL-MCNC: 4.4 G/DL (ref 3.5–5)
ALP SERPL-CCNC: 93 U/L (ref 45–120)
ALT SERPL W P-5'-P-CCNC: 30 U/L (ref 0–45)
ANION GAP SERPL CALCULATED.3IONS-SCNC: 10 MMOL/L (ref 5–18)
AST SERPL W P-5'-P-CCNC: 19 U/L (ref 0–40)
BILIRUB SERPL-MCNC: 0.5 MG/DL (ref 0–1)
BUN SERPL-MCNC: 13 MG/DL (ref 8–22)
CALCIUM SERPL-MCNC: 10 MG/DL (ref 8.5–10.5)
CHLORIDE BLD-SCNC: 104 MMOL/L (ref 98–107)
CHOLEST SERPL-MCNC: 223 MG/DL
CO2 SERPL-SCNC: 26 MMOL/L (ref 22–31)
CREAT SERPL-MCNC: 0.85 MG/DL (ref 0.7–1.3)
GFR SERPL CREATININE-BSD FRML MDRD: >90 ML/MIN/1.73M2
GLUCOSE BLD-MCNC: 104 MG/DL (ref 70–125)
HDLC SERPL-MCNC: 63 MG/DL
LDLC SERPL CALC-MCNC: 141 MG/DL
POTASSIUM BLD-SCNC: 3.9 MMOL/L (ref 3.5–5)
PROT SERPL-MCNC: 7.3 G/DL (ref 6–8)
SODIUM SERPL-SCNC: 140 MMOL/L (ref 136–145)
TRIGL SERPL-MCNC: 94 MG/DL

## 2021-10-18 PROCEDURE — 80061 LIPID PANEL: CPT | Performed by: FAMILY MEDICINE

## 2021-10-18 PROCEDURE — 84075 ASSAY ALKALINE PHOSPHATASE: CPT | Performed by: FAMILY MEDICINE

## 2022-02-08 ENCOUNTER — LAB REQUISITION (OUTPATIENT)
Dept: LAB | Facility: CLINIC | Age: 67
End: 2022-02-08
Payer: COMMERCIAL

## 2022-02-08 DIAGNOSIS — Z12.5 ENCOUNTER FOR SCREENING FOR MALIGNANT NEOPLASM OF PROSTATE: ICD-10-CM

## 2022-02-08 DIAGNOSIS — I10 ESSENTIAL (PRIMARY) HYPERTENSION: ICD-10-CM

## 2022-02-08 LAB
ANION GAP SERPL CALCULATED.3IONS-SCNC: 11 MMOL/L (ref 5–18)
BUN SERPL-MCNC: 14 MG/DL (ref 8–22)
CALCIUM SERPL-MCNC: 9.5 MG/DL (ref 8.5–10.5)
CHLORIDE BLD-SCNC: 104 MMOL/L (ref 98–107)
CO2 SERPL-SCNC: 23 MMOL/L (ref 22–31)
CREAT SERPL-MCNC: 0.82 MG/DL (ref 0.7–1.3)
GFR SERPL CREATININE-BSD FRML MDRD: >90 ML/MIN/1.73M2
GLUCOSE BLD-MCNC: 109 MG/DL (ref 70–125)
POTASSIUM BLD-SCNC: 3.9 MMOL/L (ref 3.5–5)
PSA SERPL-MCNC: 2.93 UG/L (ref 0–4.5)
SODIUM SERPL-SCNC: 138 MMOL/L (ref 136–145)

## 2022-02-08 PROCEDURE — G0103 PSA SCREENING: HCPCS | Mod: ORL | Performed by: FAMILY MEDICINE

## 2022-02-08 PROCEDURE — 82310 ASSAY OF CALCIUM: CPT | Performed by: FAMILY MEDICINE

## 2022-08-23 ENCOUNTER — LAB REQUISITION (OUTPATIENT)
Dept: LAB | Facility: CLINIC | Age: 67
End: 2022-08-23
Payer: COMMERCIAL

## 2022-08-23 DIAGNOSIS — E78.5 HYPERLIPIDEMIA, UNSPECIFIED: ICD-10-CM

## 2022-08-23 DIAGNOSIS — I10 ESSENTIAL (PRIMARY) HYPERTENSION: ICD-10-CM

## 2022-08-23 LAB
ANION GAP SERPL CALCULATED.3IONS-SCNC: 9 MMOL/L (ref 7–15)
BUN SERPL-MCNC: 9.7 MG/DL (ref 8–23)
CALCIUM SERPL-MCNC: 9.3 MG/DL (ref 8.8–10.2)
CHLORIDE SERPL-SCNC: 103 MMOL/L (ref 98–107)
CHOLEST SERPL-MCNC: 236 MG/DL
CREAT SERPL-MCNC: 0.91 MG/DL (ref 0.67–1.17)
DEPRECATED HCO3 PLAS-SCNC: 28 MMOL/L (ref 22–29)
GFR SERPL CREATININE-BSD FRML MDRD: >90 ML/MIN/1.73M2
GLUCOSE SERPL-MCNC: 109 MG/DL (ref 70–99)
HDLC SERPL-MCNC: 53 MG/DL
LDLC SERPL CALC-MCNC: 158 MG/DL
NONHDLC SERPL-MCNC: 183 MG/DL
POTASSIUM SERPL-SCNC: 4.1 MMOL/L (ref 3.4–5.3)
SODIUM SERPL-SCNC: 140 MMOL/L (ref 136–145)
TRIGL SERPL-MCNC: 126 MG/DL

## 2022-08-23 PROCEDURE — 80061 LIPID PANEL: CPT | Mod: ORL | Performed by: FAMILY MEDICINE

## 2022-08-23 PROCEDURE — 80048 BASIC METABOLIC PNL TOTAL CA: CPT | Mod: ORL | Performed by: FAMILY MEDICINE

## 2023-05-22 ENCOUNTER — LAB REQUISITION (OUTPATIENT)
Dept: LAB | Facility: CLINIC | Age: 68
End: 2023-05-22
Payer: COMMERCIAL

## 2023-05-22 DIAGNOSIS — N40.1 BENIGN PROSTATIC HYPERPLASIA WITH LOWER URINARY TRACT SYMPTOMS: ICD-10-CM

## 2023-05-22 DIAGNOSIS — I10 ESSENTIAL (PRIMARY) HYPERTENSION: ICD-10-CM

## 2023-05-22 PROCEDURE — G0103 PSA SCREENING: HCPCS | Mod: ORL | Performed by: FAMILY MEDICINE

## 2023-05-22 PROCEDURE — 80048 BASIC METABOLIC PNL TOTAL CA: CPT | Mod: ORL | Performed by: FAMILY MEDICINE

## 2023-05-22 PROCEDURE — 84153 ASSAY OF PSA TOTAL: CPT | Mod: ORL | Performed by: FAMILY MEDICINE

## 2023-05-23 LAB
ANION GAP SERPL CALCULATED.3IONS-SCNC: 15 MMOL/L (ref 7–15)
BUN SERPL-MCNC: 11.1 MG/DL (ref 8–23)
CALCIUM SERPL-MCNC: 9.3 MG/DL (ref 8.8–10.2)
CHLORIDE SERPL-SCNC: 100 MMOL/L (ref 98–107)
CREAT SERPL-MCNC: 0.94 MG/DL (ref 0.67–1.17)
DEPRECATED HCO3 PLAS-SCNC: 25 MMOL/L (ref 22–29)
GFR SERPL CREATININE-BSD FRML MDRD: 89 ML/MIN/1.73M2
GLUCOSE SERPL-MCNC: 84 MG/DL (ref 70–99)
POTASSIUM SERPL-SCNC: 3.8 MMOL/L (ref 3.4–5.3)
PSA SERPL DL<=0.01 NG/ML-MCNC: 10.3 NG/ML (ref 0–4.5)
SODIUM SERPL-SCNC: 140 MMOL/L (ref 136–145)

## 2024-10-04 ENCOUNTER — LAB REQUISITION (OUTPATIENT)
Dept: LAB | Facility: CLINIC | Age: 69
End: 2024-10-04
Payer: COMMERCIAL

## 2024-10-04 DIAGNOSIS — I10 ESSENTIAL (PRIMARY) HYPERTENSION: ICD-10-CM

## 2024-10-04 DIAGNOSIS — E78.5 HYPERLIPIDEMIA, UNSPECIFIED: ICD-10-CM

## 2024-10-04 PROCEDURE — 80048 BASIC METABOLIC PNL TOTAL CA: CPT | Mod: ORL | Performed by: FAMILY MEDICINE

## 2024-10-04 PROCEDURE — 80061 LIPID PANEL: CPT | Mod: ORL | Performed by: FAMILY MEDICINE

## 2024-10-05 LAB
ANION GAP SERPL CALCULATED.3IONS-SCNC: 14 MMOL/L (ref 7–15)
BUN SERPL-MCNC: 16.1 MG/DL (ref 8–23)
CALCIUM SERPL-MCNC: 9.4 MG/DL (ref 8.8–10.4)
CHLORIDE SERPL-SCNC: 102 MMOL/L (ref 98–107)
CHOLEST SERPL-MCNC: 243 MG/DL
CREAT SERPL-MCNC: 0.85 MG/DL (ref 0.67–1.17)
EGFRCR SERPLBLD CKD-EPI 2021: >90 ML/MIN/1.73M2
FASTING STATUS PATIENT QL REPORTED: ABNORMAL
FASTING STATUS PATIENT QL REPORTED: ABNORMAL
GLUCOSE SERPL-MCNC: 106 MG/DL (ref 70–99)
HCO3 SERPL-SCNC: 23 MMOL/L (ref 22–29)
HDLC SERPL-MCNC: 66 MG/DL
LDLC SERPL CALC-MCNC: 156 MG/DL
NONHDLC SERPL-MCNC: 177 MG/DL
POTASSIUM SERPL-SCNC: 3.6 MMOL/L (ref 3.4–5.3)
SODIUM SERPL-SCNC: 139 MMOL/L (ref 135–145)
TRIGL SERPL-MCNC: 106 MG/DL